# Patient Record
Sex: FEMALE | Race: BLACK OR AFRICAN AMERICAN | NOT HISPANIC OR LATINO | URBAN - METROPOLITAN AREA
[De-identification: names, ages, dates, MRNs, and addresses within clinical notes are randomized per-mention and may not be internally consistent; named-entity substitution may affect disease eponyms.]

---

## 2018-07-10 ENCOUNTER — TESTING ONLY (OUTPATIENT)
Dept: URBAN - METROPOLITAN AREA CLINIC 23 | Facility: CLINIC | Age: 78
End: 2018-07-10
Payer: COMMERCIAL

## 2018-07-10 PROCEDURE — 92083 EXTENDED VISUAL FIELD XM: CPT | Performed by: OPHTHALMOLOGY

## 2018-07-10 PROCEDURE — 92133 CPTRZD OPH DX IMG PST SGM ON: CPT | Performed by: OPHTHALMOLOGY

## 2018-07-11 ENCOUNTER — OFFICE VISIT (OUTPATIENT)
Dept: URBAN - METROPOLITAN AREA CLINIC 23 | Facility: CLINIC | Age: 78
End: 2018-07-11
Payer: COMMERCIAL

## 2018-07-11 DIAGNOSIS — H25.13 AGE-RELATED NUCLEAR CATARACT, BILATERAL: ICD-10-CM

## 2018-07-11 PROCEDURE — 99213 OFFICE O/P EST LOW 20 MIN: CPT | Performed by: OPHTHALMOLOGY

## 2018-07-11 RX ORDER — DORZOLAMIDE HCL 20 MG/ML
2 % SOLUTION/ DROPS OPHTHALMIC
Qty: 3 | Refills: 4 | Status: INACTIVE
Start: 2018-07-11 | End: 2018-07-12

## 2018-07-11 ASSESSMENT — INTRAOCULAR PRESSURE
OD: 23
OS: 22

## 2018-07-11 NOTE — IMPRESSION/PLAN
Impression: Age-related nuclear cataract, bilateral: H25.13. Plan: Discussed diagnosis in detail with patient. Discussed treatment options with patient. Discussed risks of progression. Consult recommended [Cataract Specialist].

## 2018-07-11 NOTE — IMPRESSION/PLAN
Impression: Primary open-angle glaucoma, bilateral, severe stage. Code: P64.0101. Trab OD - ALT OD x 2-ALT OS 1/12/17,  S/P -ALT OD 1/24/17-
IOP doing well os - ONH stable ou - IOP OD borderline - Intolerant to Rhopressa Plan: Discussed VF and OCT results with patient. Discussed diagnosis, explained and understood by patient. Discussed IOP/ONH/Glaucoma management and risks. Continue brimonidine bid ou, timolol bid ou, dorzolamide tid ou and lumigan qhs ou. Will continue to monitor condition and symptoms.

## 2018-08-29 ENCOUNTER — OFFICE VISIT (OUTPATIENT)
Dept: URBAN - METROPOLITAN AREA CLINIC 23 | Facility: CLINIC | Age: 78
End: 2018-08-29
Payer: COMMERCIAL

## 2018-08-29 PROCEDURE — 99214 OFFICE O/P EST MOD 30 MIN: CPT | Performed by: OPHTHALMOLOGY

## 2018-08-29 RX ORDER — OFLOXACIN 3 MG/ML
0.3 % SOLUTION/ DROPS OPHTHALMIC
Qty: 5 | Refills: 1 | Status: INACTIVE
Start: 2018-08-29 | End: 2018-11-15

## 2018-08-29 RX ORDER — PREDNISOLONE ACETATE 10 MG/ML
1 % SUSPENSION/ DROPS OPHTHALMIC
Qty: 10 | Refills: 1 | Status: INACTIVE
Start: 2018-08-29 | End: 2018-11-15

## 2018-08-29 ASSESSMENT — KERATOMETRY
OS: 45.00
OD: 44.75

## 2018-08-29 ASSESSMENT — INTRAOCULAR PRESSURE
OD: 23
OS: 21

## 2018-08-29 ASSESSMENT — VISUAL ACUITY
OS: 20/25
OD: 20/100

## 2018-08-29 NOTE — IMPRESSION/PLAN
Impression: Primary open-angle glaucoma, bilateral, severe stage. Code: Z41.2183. Trab OD - ALT OD x 2-ALT OS 1/12/17,  S/P -ALT OD 1/24/17-
IOP doing well os - ONH stable ou - IOP OD borderline - Intolerant to Rhopressa Plan: Continue brimonidine bid ou, timolol bid ou, dorzolamide tid ou and lumigan qhs ou. Will continue to monitor condition and symptoms.

## 2018-08-29 NOTE — IMPRESSION/PLAN
Impression: Combined forms of age-related cataract, bilateral: H25.813. Condition: quality of life issue. Symptoms: could improve with surgery. Vision: vision affected. Plan: Cataracts account for the patient's complaints. Discussed all risks, benefits, procedures and recovery. Patient understands changing glasses will not improve vision. Patient desires to have surgery, recommend phacoemulsification with intraocular lens. RL-2 Recommend Toric IOL aim plano.

## 2018-09-27 ENCOUNTER — PRE-OPERATIVE VISIT (OUTPATIENT)
Dept: URBAN - METROPOLITAN AREA CLINIC 23 | Facility: CLINIC | Age: 78
End: 2018-09-27
Payer: COMMERCIAL

## 2018-09-27 PROCEDURE — 92136 OPHTHALMIC BIOMETRY: CPT | Performed by: OPHTHALMOLOGY

## 2018-09-27 ASSESSMENT — PACHYMETRY
OD: 3.55
OD: 24.62
OS: 24.17
OS: 3.60

## 2018-10-01 ENCOUNTER — PRE-OPERATIVE VISIT (OUTPATIENT)
Dept: URBAN - METROPOLITAN AREA CLINIC 23 | Facility: CLINIC | Age: 78
End: 2018-10-01
Payer: COMMERCIAL

## 2018-10-01 DIAGNOSIS — H25.813 COMBINED FORMS OF AGE-RELATED CATARACT, BILATERAL: Primary | ICD-10-CM

## 2018-10-01 PROCEDURE — 92025 CPTRIZED CORNEAL TOPOGRAPHY: CPT | Performed by: OPHTHALMOLOGY

## 2018-10-05 ENCOUNTER — SURGERY (OUTPATIENT)
Dept: URBAN - METROPOLITAN AREA SURGERY 11 | Facility: SURGERY | Age: 78
End: 2018-10-05
Payer: COMMERCIAL

## 2018-10-05 PROCEDURE — 66984 XCAPSL CTRC RMVL W/O ECP: CPT | Performed by: OPHTHALMOLOGY

## 2018-10-06 ENCOUNTER — POST-OPERATIVE VISIT (OUTPATIENT)
Dept: URBAN - METROPOLITAN AREA CLINIC 23 | Facility: CLINIC | Age: 78
End: 2018-10-06

## 2018-10-06 PROCEDURE — 99024 POSTOP FOLLOW-UP VISIT: CPT | Performed by: OPTOMETRIST

## 2018-10-06 ASSESSMENT — INTRAOCULAR PRESSURE
OS: 20
OD: 29

## 2018-10-11 ENCOUNTER — POST-OPERATIVE VISIT (OUTPATIENT)
Dept: URBAN - METROPOLITAN AREA CLINIC 23 | Facility: CLINIC | Age: 78
End: 2018-10-11

## 2018-10-11 DIAGNOSIS — Z09 ENCNTR FOR F/U EXAM AFT TRTMT FOR COND OTH THAN MALIG NEOPLM: Primary | ICD-10-CM

## 2018-10-11 PROCEDURE — 99024 POSTOP FOLLOW-UP VISIT: CPT | Performed by: OPTOMETRIST

## 2018-10-11 ASSESSMENT — INTRAOCULAR PRESSURE
OS: 24
OD: 34

## 2018-10-11 ASSESSMENT — VISUAL ACUITY
OD: 20/400
OS: 20/30

## 2018-11-15 ENCOUNTER — OFFICE VISIT (OUTPATIENT)
Dept: URBAN - METROPOLITAN AREA CLINIC 23 | Facility: CLINIC | Age: 78
End: 2018-11-15
Payer: COMMERCIAL

## 2018-11-15 PROCEDURE — 99213 OFFICE O/P EST LOW 20 MIN: CPT | Performed by: OPHTHALMOLOGY

## 2018-11-15 RX ORDER — BIMATOPROST 0.1 MG/ML
0.01 % SOLUTION/ DROPS OPHTHALMIC
Qty: 3 | Refills: 3 | Status: INACTIVE
Start: 2018-11-15 | End: 2019-08-20

## 2018-11-15 ASSESSMENT — INTRAOCULAR PRESSURE
OD: 36
OS: 24

## 2018-11-15 NOTE — IMPRESSION/PLAN
Impression: Primary open-angle glaucoma, bilateral, severe stage. Code: Z37.7508. Trab OD - ALT OD x 2-ALT OS 1/12/17,  S/P -ALT OD 1/24/17-
IOP elevated OU-increased cupping OU Intolerant to Rhopressa--Pt has been off glaucoma gtts and restarted 1 week ago
thick cct's may need to adjust IOP Plan: Discussed diagnosis, explained and understood by patient. Discussed IOP/ONH/Glaucoma management and risks. Continue Lumigan QAM OU, Timolol BID OU, Dorzolamide BID OU, Brimonidine BID OU. Stressed compliance. Discussed possible need for additional surgery if IOP is not adequately controlled with gtts. Will continue to monitor condition and symptoms.

## 2018-11-15 NOTE — IMPRESSION/PLAN
Impression: Age-related nuclear cataract, left eye: H25.12. Plan: Discussed diagnosis in detail with patient. Discussed treatment options with patient. Discussed risks of progression. Surgical treatment is required. Surgical risks and benefits were discussed, explained and understood by patient. Patient elects to have surgery.

## 2018-11-29 ENCOUNTER — OFFICE VISIT (OUTPATIENT)
Dept: URBAN - METROPOLITAN AREA CLINIC 32 | Facility: CLINIC | Age: 78
End: 2018-11-29
Payer: COMMERCIAL

## 2018-11-29 PROCEDURE — 99213 OFFICE O/P EST LOW 20 MIN: CPT | Performed by: OPHTHALMOLOGY

## 2018-11-29 RX ORDER — PREDNISOLONE ACETATE 10 MG/ML
1 % SUSPENSION/ DROPS OPHTHALMIC
Qty: 1 | Refills: 0 | Status: INACTIVE
Start: 2018-11-29 | End: 2019-05-03

## 2018-11-29 ASSESSMENT — INTRAOCULAR PRESSURE
OS: 14
OD: 26

## 2018-11-29 NOTE — IMPRESSION/PLAN
Impression: Primary open-angle glaucoma, bilateral, severe stage. Code: W48.6927. thick CCT's ou - Trab OD - ALT OD x 2-ALT OS 1/12/17,  S/P -ALT OD 1/24/17-
IOP OS doing well-**IOP OD still too high. Intolerant to Rhopressa--
thick cct's may need to adjust IOP Plan: Discussed diagnosis, explained and understood by patient. Discussed IOP/ONH/Glaucoma management and risks. Discussed aqueous shunt vs TSSCPC laser. Continue Lumigan QAM OU, Timolol BID OU, Dorzolamide BID OU, Brimonidine BID OU. 
-recommends Gprobe Trans-scleral CPC laser OD to lower IOP. Discussed RBA's and laser procedure. RL=2
-patient elects TSSCPC laser OD.

## 2019-05-03 ENCOUNTER — OFFICE VISIT (OUTPATIENT)
Dept: URBAN - METROPOLITAN AREA CLINIC 23 | Facility: CLINIC | Age: 79
End: 2019-05-03
Payer: COMMERCIAL

## 2019-05-03 PROCEDURE — 99213 OFFICE O/P EST LOW 20 MIN: CPT | Performed by: OPTOMETRIST

## 2019-05-03 RX ORDER — NETARSUDIL 0.2 MG/ML
0.02 % SOLUTION/ DROPS OPHTHALMIC; TOPICAL
Qty: 1 | Refills: 0 | Status: INACTIVE
Start: 2019-05-03 | End: 2019-06-14

## 2019-05-03 ASSESSMENT — INTRAOCULAR PRESSURE
OS: 24
OD: 30

## 2019-05-03 NOTE — IMPRESSION/PLAN
Impression: Primary open-angle glaucoma, bilateral, severe stage: H40.1133 OU. Plan: Discussed findings. Iop high. Added Rhopressa qhs OD. Sample given. Recheck iop in 3 weeks. Briefly discussed sx, pt declined, wants to try drops and do sx as last resort.

## 2019-06-14 ENCOUNTER — OFFICE VISIT (OUTPATIENT)
Dept: URBAN - METROPOLITAN AREA CLINIC 23 | Facility: CLINIC | Age: 79
End: 2019-06-14
Payer: COMMERCIAL

## 2019-06-14 PROCEDURE — 99213 OFFICE O/P EST LOW 20 MIN: CPT | Performed by: OPTOMETRIST

## 2019-06-14 RX ORDER — NETARSUDIL 0.2 MG/ML
0.02 % SOLUTION/ DROPS OPHTHALMIC; TOPICAL
Qty: 3 | Refills: 3 | Status: INACTIVE
Start: 2019-06-14 | End: 2019-08-20

## 2019-06-14 RX ORDER — DORZOLAMIDE HCL 20 MG/ML
2 % SOLUTION/ DROPS OPHTHALMIC
Qty: 3 | Refills: 3 | Status: INACTIVE
Start: 2019-06-14 | End: 2019-08-20

## 2019-06-14 RX ORDER — TIMOLOL MALEATE 5 MG/ML
0.5 % SOLUTION/ DROPS OPHTHALMIC
Qty: 1 | Refills: 10 | Status: INACTIVE
Start: 2019-06-14 | End: 2019-08-20

## 2019-06-14 ASSESSMENT — INTRAOCULAR PRESSURE
OD: 22
OS: 15
OS: 21
OD: 28

## 2019-06-14 NOTE — IMPRESSION/PLAN
Impression: Primary open-angle glaucoma, bilateral, severe stage: H40.1133 OU. Plan: Discussed findings. OD iop not as reduced as it should be. Recommended consult for sx, but pt declined at this time. May think about it in the future. Pt to continue all drops and return for iop check.

## 2019-07-17 ENCOUNTER — OFFICE VISIT (OUTPATIENT)
Dept: URBAN - METROPOLITAN AREA CLINIC 23 | Facility: CLINIC | Age: 79
End: 2019-07-17
Payer: COMMERCIAL

## 2019-07-17 PROCEDURE — 99213 OFFICE O/P EST LOW 20 MIN: CPT | Performed by: OPTOMETRIST

## 2019-07-17 RX ORDER — BRIMONIDINE TARTRATE, TIMOLOL MALEATE 2; 5 MG/ML; MG/ML
SOLUTION/ DROPS OPHTHALMIC
Qty: 3 | Refills: 3 | Status: INACTIVE
Start: 2019-07-17 | End: 2020-06-19

## 2019-07-17 ASSESSMENT — INTRAOCULAR PRESSURE
OS: 15
OD: 23

## 2019-07-17 NOTE — IMPRESSION/PLAN
Impression: Primary open-angle glaucoma, bilateral, severe stage: H40.1133 OU. Plan: Discussed findings. Iop OD not lowering. Per pt is compliant with all drops and waits between them. Will switch to Combigan to make it easier for pt and reduce preservative. Pt would like to continue trying drops vs sx. Monitor.

## 2019-08-20 ENCOUNTER — OFFICE VISIT (OUTPATIENT)
Dept: URBAN - METROPOLITAN AREA CLINIC 23 | Facility: CLINIC | Age: 79
End: 2019-08-20
Payer: COMMERCIAL

## 2019-08-20 PROCEDURE — 99213 OFFICE O/P EST LOW 20 MIN: CPT | Performed by: OPTOMETRIST

## 2019-08-20 ASSESSMENT — KERATOMETRY
OS: 44.38
OD: 44.75

## 2019-08-20 ASSESSMENT — INTRAOCULAR PRESSURE
OD: 22
OS: 15

## 2019-08-20 NOTE — IMPRESSION/PLAN
Impression: Primary open-angle glaucoma, bilateral, severe stage: H40.1133 OU. Plan: Discussed findings. Iop not lower with drops. Briefly discussed TSCP to reduce iop. Recommend pt see glaucoma specialist. Pt to continue using Combigan as prescribed. Stent has been recommended but she would like to avoid incisional surgery. She has had previous ALT OU.  Discussed that without lowering IOP further, vision will continue to be lost.

## 2019-11-15 ENCOUNTER — NEW PATIENT (OUTPATIENT)
Dept: URBAN - METROPOLITAN AREA CLINIC 24 | Facility: CLINIC | Age: 79
End: 2019-11-15
Payer: COMMERCIAL

## 2019-11-15 PROCEDURE — 76514 ECHO EXAM OF EYE THICKNESS: CPT | Performed by: OPHTHALMOLOGY

## 2019-11-15 PROCEDURE — 92133 CPTRZD OPH DX IMG PST SGM ON: CPT | Performed by: OPHTHALMOLOGY

## 2019-11-15 PROCEDURE — 92083 EXTENDED VISUAL FIELD XM: CPT | Performed by: OPHTHALMOLOGY

## 2019-11-15 PROCEDURE — 92250 FUNDUS PHOTOGRAPHY W/I&R: CPT | Performed by: OPHTHALMOLOGY

## 2019-11-15 PROCEDURE — 92020 GONIOSCOPY: CPT | Performed by: OPHTHALMOLOGY

## 2019-11-15 PROCEDURE — 99204 OFFICE O/P NEW MOD 45 MIN: CPT | Performed by: OPHTHALMOLOGY

## 2019-11-15 RX ORDER — NETARSUDIL AND LATANOPROST OPHTHALMIC SOLUTION, 0.02%/0.005% .2; .05 MG/ML; MG/ML
SOLUTION/ DROPS OPHTHALMIC; TOPICAL
Qty: 1 | Refills: 3 | Status: INACTIVE
Start: 2019-11-15 | End: 2020-06-19

## 2019-11-15 ASSESSMENT — INTRAOCULAR PRESSURE
OD: 38
OS: 28

## 2019-12-05 ENCOUNTER — FOLLOW UP ESTABLISHED (OUTPATIENT)
Dept: URBAN - METROPOLITAN AREA CLINIC 24 | Facility: CLINIC | Age: 79
End: 2019-12-05
Payer: COMMERCIAL

## 2019-12-05 PROCEDURE — 92014 COMPRE OPH EXAM EST PT 1/>: CPT | Performed by: OPHTHALMOLOGY

## 2019-12-05 ASSESSMENT — INTRAOCULAR PRESSURE
OS: 18
OD: 19

## 2020-02-27 ENCOUNTER — FOLLOW UP ESTABLISHED (OUTPATIENT)
Dept: URBAN - METROPOLITAN AREA CLINIC 24 | Facility: CLINIC | Age: 80
End: 2020-02-27
Payer: COMMERCIAL

## 2020-02-27 PROCEDURE — 92014 COMPRE OPH EXAM EST PT 1/>: CPT | Performed by: OPHTHALMOLOGY

## 2020-02-27 ASSESSMENT — INTRAOCULAR PRESSURE
OS: 20
OD: 23

## 2020-04-10 ENCOUNTER — Encounter (OUTPATIENT)
Dept: URBAN - METROPOLITAN AREA CLINIC 24 | Facility: CLINIC | Age: 80
End: 2020-04-10
Payer: COMMERCIAL

## 2020-04-10 DIAGNOSIS — H40.1133 PRIMARY OPEN-ANGLE GLAUCOMA, BILATERAL, SEVERE STAGE: Primary | ICD-10-CM

## 2020-04-10 PROCEDURE — 65855 TRABECULOPLASTY LASER SURG: CPT | Performed by: OPHTHALMOLOGY

## 2020-06-19 ENCOUNTER — FOLLOW UP ESTABLISHED (OUTPATIENT)
Dept: URBAN - METROPOLITAN AREA CLINIC 24 | Facility: CLINIC | Age: 80
End: 2020-06-19
Payer: COMMERCIAL

## 2020-06-19 PROCEDURE — 92012 INTRM OPH EXAM EST PATIENT: CPT | Performed by: OPHTHALMOLOGY

## 2020-06-19 RX ORDER — NETARSUDIL AND LATANOPROST OPHTHALMIC SOLUTION, 0.02%/0.005% .2; .05 MG/ML; MG/ML
SOLUTION/ DROPS OPHTHALMIC; TOPICAL
Qty: 1 | Refills: 3 | Status: INACTIVE
Start: 2020-06-19 | End: 2021-05-05

## 2020-06-19 RX ORDER — DORZOLAMIDE HCL 20 MG/ML
2 % SOLUTION/ DROPS OPHTHALMIC
Qty: 1 | Refills: 3 | Status: INACTIVE
Start: 2020-06-19 | End: 2021-03-12

## 2020-06-19 RX ORDER — DORZOLAMIDE HCL 20 MG/ML
2 % SOLUTION/ DROPS OPHTHALMIC
Qty: 1 | Refills: 3 | Status: INACTIVE
Start: 2020-06-19 | End: 2020-06-19

## 2020-06-19 RX ORDER — BRIMONIDINE TARTRATE, TIMOLOL MALEATE 2; 5 MG/ML; MG/ML
SOLUTION/ DROPS OPHTHALMIC
Qty: 3 | Refills: 3 | Status: INACTIVE
Start: 2020-06-19 | End: 2020-08-03

## 2020-06-19 ASSESSMENT — INTRAOCULAR PRESSURE
OD: 33
OS: 33

## 2020-08-20 ENCOUNTER — FOLLOW UP ESTABLISHED (OUTPATIENT)
Dept: URBAN - METROPOLITAN AREA CLINIC 24 | Facility: CLINIC | Age: 80
End: 2020-08-20
Payer: COMMERCIAL

## 2020-08-20 PROCEDURE — 92014 COMPRE OPH EXAM EST PT 1/>: CPT | Performed by: OPHTHALMOLOGY

## 2020-08-20 ASSESSMENT — INTRAOCULAR PRESSURE
OD: 19
OS: 18

## 2021-02-25 ENCOUNTER — OFFICE VISIT (OUTPATIENT)
Dept: URBAN - METROPOLITAN AREA CLINIC 29 | Facility: CLINIC | Age: 81
End: 2021-02-25
Payer: COMMERCIAL

## 2021-02-25 PROCEDURE — 92133 CPTRZD OPH DX IMG PST SGM ON: CPT | Performed by: OPHTHALMOLOGY

## 2021-02-25 PROCEDURE — 99214 OFFICE O/P EST MOD 30 MIN: CPT | Performed by: OPHTHALMOLOGY

## 2021-02-25 RX ORDER — PREDNISOLONE ACETATE 10 MG/ML
1 % SUSPENSION/ DROPS OPHTHALMIC
Qty: 5 | Refills: 0 | Status: INACTIVE
Start: 2021-02-25 | End: 2021-03-25

## 2021-02-25 RX ORDER — DORZOLAMIDE HCL 20 MG/ML
2 % SOLUTION/ DROPS OPHTHALMIC
Qty: 5 | Refills: 11 | Status: INACTIVE
Start: 2021-02-25 | End: 2021-03-25

## 2021-02-25 ASSESSMENT — INTRAOCULAR PRESSURE
OS: 23
OD: 28

## 2021-02-25 NOTE — IMPRESSION/PLAN
Impression: Primary open-angle glaucoma, right eye, severe stage: H40.1113. CCT thick OU Trab OD ALT OS 1/12/17 ALT OD 1/24/17 Intolerant to Rhopressa, combigan
IOP elevated OD, stable OS Plan: Discussed diagnosis, explained and understood by patient. Discussed IOP/ONH/Glaucoma management and risks. OCT ordered, performed and reviewed. Continue dorzolamide tid ou and rocklatan qhs ou. D/C combigan, unable to tolerate. Discussed side effects of glaucoma meds. Recommend Micro-pulse Trans-scleral CPC diode laser OD to possibly lower IOP. Patient elects [facility] laser procedure. Discussed RBA's and treatment options. RL=2. Start prednisolone qid after laser.

## 2021-02-25 NOTE — IMPRESSION/PLAN
Impression: Other secondary cataract, right eye: H26.491. Plan: Discussed diagnosis in detail with patient. Discussed treatment options and advised patient of condition. Recommend YAG  to possibly improve vision. Patient elects to have YAG. Discussed RBA's and laser procedure.  RL=2.

## 2021-03-25 ENCOUNTER — OFFICE VISIT (OUTPATIENT)
Dept: URBAN - METROPOLITAN AREA CLINIC 29 | Facility: CLINIC | Age: 81
End: 2021-03-25
Payer: MEDICARE

## 2021-03-25 DIAGNOSIS — H40.1122 PRIMARY OPEN-ANGLE GLAUCOMA, LEFT EYE, MODERATE STAGE: ICD-10-CM

## 2021-03-25 DIAGNOSIS — H40.1113 PRIMARY OPEN-ANGLE GLAUCOMA, RIGHT EYE, SEVERE STAGE: ICD-10-CM

## 2021-03-25 DIAGNOSIS — H35.372 PUCKERING OF MACULA, LEFT EYE: Primary | ICD-10-CM

## 2021-03-25 DIAGNOSIS — H52.223 REGULAR ASTIGMATISM, BILATERAL: ICD-10-CM

## 2021-03-25 PROCEDURE — 92134 CPTRZ OPH DX IMG PST SGM RTA: CPT | Performed by: OPTOMETRIST

## 2021-03-25 PROCEDURE — 92014 COMPRE OPH EXAM EST PT 1/>: CPT | Performed by: OPTOMETRIST

## 2021-03-25 ASSESSMENT — VISUAL ACUITY
OD: 20/200
OS: 20/30

## 2021-03-25 ASSESSMENT — INTRAOCULAR PRESSURE
OS: 22
OD: 28

## 2021-03-25 ASSESSMENT — KERATOMETRY
OS: 44.75
OD: 44.88

## 2021-03-25 NOTE — IMPRESSION/PLAN
Impression: Puckering of macula, left eye: H35.372. Plan: Discussed diagnosis in detail with patient. No treatment is required at this time. Will continue to observe condition and or symptoms. Reassured patient of current condition and treatment. Call if Symptoms occur.

## 2021-03-25 NOTE — IMPRESSION/PLAN
Impression: Primary open-angle glaucoma, left eye, moderate stage: Y33.5917. Plan: Discussed diagnosis in detail with patient. Discussed treatment options with patient. No change to current treatment. Will continue to observe condition and or symptoms. Continue using current medication(s). Emphasized and explained compliance.

## 2021-03-25 NOTE — IMPRESSION/PLAN
Impression: Primary open-angle glaucoma, right eye, severe stage: H40.1113. Plan: Discussed diagnosis in detail with patient. Discussed treatment options with patient. No change to current treatment. Will continue to observe condition and or symptoms. Continue using current medication(s). Emphasized and explained compliance.

## 2021-03-25 NOTE — ASSESSMENT/PLAN
Impression: OCT MAC - OD: Good-; OS: Good- Plan: Central macular traction without detachment or edema OS

## 2021-03-25 NOTE — IMPRESSION/PLAN
Impression: Age-related nuclear cataract, left eye: H25.12. Plan: Discussed diagnosis in detail with patient. No treatment is required at this time. Patient will update glasses RX first. Will continue to observe condition and or symptoms.

## 2021-05-05 ENCOUNTER — OFFICE VISIT (OUTPATIENT)
Dept: URBAN - METROPOLITAN AREA CLINIC 24 | Facility: CLINIC | Age: 81
End: 2021-05-05
Payer: MEDICARE

## 2021-05-05 PROCEDURE — 99214 OFFICE O/P EST MOD 30 MIN: CPT | Performed by: OPHTHALMOLOGY

## 2021-05-05 RX ORDER — DORZOLAMIDE HCL 20 MG/ML
2 % SOLUTION/ DROPS OPHTHALMIC
Qty: 15 | Refills: 3 | Status: ACTIVE
Start: 2021-05-05

## 2021-05-05 RX ORDER — NETARSUDIL AND LATANOPROST OPHTHALMIC SOLUTION, 0.02%/0.005% .2; .05 MG/ML; MG/ML
SOLUTION/ DROPS OPHTHALMIC; TOPICAL
Qty: 5 | Refills: 3 | Status: INACTIVE
Start: 2021-05-05 | End: 2021-06-30

## 2021-05-05 RX ORDER — BRIMONIDINE TARTRATE, TIMOLOL MALEATE 2; 5 MG/ML; MG/ML
SOLUTION/ DROPS OPHTHALMIC
Qty: 15 | Refills: 3 | Status: INACTIVE
Start: 2021-05-05 | End: 2021-12-06

## 2021-05-05 ASSESSMENT — INTRAOCULAR PRESSURE
OD: 38
OS: 25

## 2021-05-05 NOTE — IMPRESSION/PLAN
Impression: Other secondary cataract, right eye: H26.491. Plan: discussed diagnosis with patient. patient elects treatment.  Plan for YAG OD

## 2021-05-05 NOTE — IMPRESSION/PLAN
Impression: Primary open-angle glaucoma, severe stage, bilateral
s/p Trab OD Linell Chess s/p ALT Erasmo OU Plan: Pt has Glaucoma    Gonio :  open to SS with changes from ALT OU    Pachs: 586/601    Today's IOP : 38/25 Target IOP low teens Pt denies Fhx of Glaucoma Left eye is the better seeing eye  
HVF(11/15/19) - poor testing, may repeat field OU. C/D:0.9-0.95/0.85-0.85 Pt denies Sulfa Allergy   // Pt denies Lung /Heart dx Plan :
1. Continue (patient reports she has been out of come drops) all samples given Combigan BID OU, 
Dorzolamide TID OU, Rocklatan QHS OU. Discussed details about Glaucoma and that without proper control of pressures irreversible blindness can occur. Patient understands risks. Emphasize compliance with drop and without compliance vision loss progression can occur. 2. IOP is high  OU, given patients amount of vision loss concluded that it may too high. 3. Discussed with patient due to surgical history, the other options are more aggressive. XEN vs Diode vs Tube Shunt was discussed. Risks/benefits were discussed and understood by patient. Patient defers surgery 8/2020-5/2021 4.  Patient to return in 2 weeks for IOP on all meds with Optom,

## 2021-05-21 ENCOUNTER — OFFICE VISIT (OUTPATIENT)
Dept: URBAN - METROPOLITAN AREA CLINIC 24 | Facility: CLINIC | Age: 81
End: 2021-05-21
Payer: MEDICARE

## 2021-05-21 PROCEDURE — 99203 OFFICE O/P NEW LOW 30 MIN: CPT | Performed by: OPTOMETRIST

## 2021-05-21 ASSESSMENT — INTRAOCULAR PRESSURE
OS: 19
OD: 23

## 2021-05-21 NOTE — IMPRESSION/PLAN
Impression: Primary open-angle glaucoma, severe stage, bilateral
s/p Trab OD Kiran Steven s/p ALT Erasmo OU Plan: IOP improved from previous, pt reports strict compliance with gtts. Continue Combigan BID OU, 
Dorzolamide TID OU, Rocklatan QHS OU. Patient no longer wants care with Dr. Kiran Steven, is requesting all glc care with Dr. Corby Dupree.

## 2021-06-30 ENCOUNTER — OFFICE VISIT (OUTPATIENT)
Dept: URBAN - METROPOLITAN AREA CLINIC 24 | Facility: CLINIC | Age: 81
End: 2021-06-30
Payer: MEDICARE

## 2021-06-30 DIAGNOSIS — H25.12 AGE-RELATED NUCLEAR CATARACT, LEFT EYE: ICD-10-CM

## 2021-06-30 PROCEDURE — 99214 OFFICE O/P EST MOD 30 MIN: CPT | Performed by: OPHTHALMOLOGY

## 2021-06-30 RX ORDER — NETARSUDIL AND LATANOPROST OPHTHALMIC SOLUTION, 0.02%/0.005% .2; .05 MG/ML; MG/ML
SOLUTION/ DROPS OPHTHALMIC; TOPICAL
Qty: 5 | Refills: 3 | Status: INACTIVE
Start: 2021-06-30 | End: 2021-09-09

## 2021-06-30 ASSESSMENT — INTRAOCULAR PRESSURE
OD: 22
OS: 17

## 2021-06-30 NOTE — IMPRESSION/PLAN
Impression: Age-related nuclear cataract, left eye: H25.12. Plan: At patient desire will proceed Discussed cataract diagnosis with the patient. Discussed risks, benefits and alternatives to surgery including but not limited to: bleeding, infection, risk of vision loss, loss of the eye, need for other surgery. Patient voiced understanding and wishes to proceed. Patient elects surgical treatment. Specialty lens options discussed and pt declines. Patient desires surgery OU (( AIM  PL OU,)) Patient understands the need for glasses after surgery for BCVA. MIGS Discussed with pt limitations of MIGS device and it does not replace  Glaucoma eye drops and would have to continue treatment and would still need glasses after surgery. Understands possible use of iris stretch. Risk Level: 
Left eye second(PC IOL (Standard W/ IStent/canaloplasty /Hydrus ) Minutes Drops

## 2021-06-30 NOTE — IMPRESSION/PLAN
Impression: Primary open-angle glaucoma, severe stage, bilateral
s/p Trab OD Aislinn Jones s/p ALT Erasmo OU Plan: Pt has Glaucoma    Gonio :  open to SS with changes from ALT OU    Pachs: 586/601    Today's IOP : 22/17 Target IOP low teens Pt denies Fhx of Glaucoma Left eye is the better seeing eye  
HVF(11/15/19) - poor testing, may repeat field OU. C/D:0.9-0.95/0.85-0.85 Pt denies Sulfa Allergy   // Pt denies Lung /Heart dx Plan :
1. Continue (patient reports she has been out of come drops) all samples given Combigan BID OU, 
Dorzolamide TID OU, Rocklatan QHS OU. Discussed details about Glaucoma and that without proper control of pressures irreversible blindness can occur. Patient understands risks. Emphasize compliance with drop and without compliance vision loss progression can occur. 2.IOP is still elevated. 3.Micropluse OD  Discussed with the patient the findings of today's exam. IOP is too high for the level of glaucomatous damage at the present time. I recommend patient have a Satish Lint. Risks, benefits, alternatives to laser discussed with patient, including pain, bleeding, loss of vision, loss of eye, need for further surgery, double vision, retinal problems, infection, and inflammation. Although surgery is expected to improve the condition, the patient understands that there is a chance the condition could worsen in the future. The procedure is being done in an attempt to preserve the current level of vision. Vision improvement is NOT expected. Explained to patient the procedure has a 50% success rate and additional procedures may be necessary. Patient advised that ongoing uncontrolled glaucoma may result in permanent vision loss. All questions answered. Patient wishes to proceed with laser.
-See Alexia Perez to schedule Micropulse MP3 laser in the OD/OS Eye, H&P, Pred QID 4.  Return to clinic in 1-2 mo IOP check VFT and RNFL// Gl[facility] OS only

## 2021-09-09 ENCOUNTER — OFFICE VISIT (OUTPATIENT)
Dept: URBAN - METROPOLITAN AREA CLINIC 29 | Facility: CLINIC | Age: 81
End: 2021-09-09
Payer: MEDICARE

## 2021-09-09 PROCEDURE — 99214 OFFICE O/P EST MOD 30 MIN: CPT | Performed by: OPHTHALMOLOGY

## 2021-09-09 PROCEDURE — 92020 GONIOSCOPY: CPT | Performed by: OPHTHALMOLOGY

## 2021-09-09 RX ORDER — NETARSUDIL AND LATANOPROST OPHTHALMIC SOLUTION, 0.02%/0.005% .2; .05 MG/ML; MG/ML
SOLUTION/ DROPS OPHTHALMIC; TOPICAL
Qty: 5 | Refills: 3 | Status: ACTIVE
Start: 2021-09-09

## 2021-09-09 ASSESSMENT — INTRAOCULAR PRESSURE
OD: 27
OS: 27

## 2021-09-09 NOTE — IMPRESSION/PLAN
Impression: Primary open-angle glaucoma, left eye, moderate stage: E72.0035. Plan: Discussed adding drops vs laser. Patient elects SLT. Recommend Trabeculoplasty (SLT) OS to possibly lower IOP. Discussed RBA's of laser trabeculoplasty .  RL=2

## 2021-09-09 NOTE — IMPRESSION/PLAN
Impression: Primary open-angle glaucoma, right eye, severe stage: H40.1113. CCT thick OU Trab OD ALT OS 1/12/17 ALT OD 1/24/17 Intolerant to Rhopressa, combigan
IOP elevated OD >OS Plan: Discussed diagnosis, explained and understood by patient. Discussed IOP/ONH/Glaucoma management and risks. Continue dorzolamide tid ou and rocklatan qhs ou. combigan bid ou. .  Discussed adding drops vs laser. Patient elects SLT. Recommend Trabeculoplasty (SLT) OD to possibly lower IOP. Discussed RBA's of laser trabeculoplasty .  RL=2

## 2022-02-09 ENCOUNTER — OFFICE VISIT (OUTPATIENT)
Dept: URBAN - METROPOLITAN AREA CLINIC 24 | Facility: CLINIC | Age: 82
End: 2022-02-09
Payer: MEDICARE

## 2022-02-09 DIAGNOSIS — H26.491 OTHER SECONDARY CATARACT, RIGHT EYE: ICD-10-CM

## 2022-02-09 PROCEDURE — 99214 OFFICE O/P EST MOD 30 MIN: CPT | Performed by: OPHTHALMOLOGY

## 2022-02-09 RX ORDER — PREDNISOLONE ACETATE 10 MG/ML
1 % SUSPENSION/ DROPS OPHTHALMIC
Qty: 5 | Refills: 1 | Status: INACTIVE
Start: 2022-02-09 | End: 2022-04-08

## 2022-02-09 ASSESSMENT — INTRAOCULAR PRESSURE
OS: 31
OD: 36

## 2022-02-09 NOTE — IMPRESSION/PLAN
Impression: Primary open-angle glaucoma, severe stage, bilateral
s/p Trab OD George Meyer s/p ALT Erasmo OU Plan: Pt has Glaucoma    Gonio :  open to SS with changes from ALT OU    Pachs: 586/601    Today's IOP : 36, 31 Target IOP low teens Pt denies Fhx of Glaucoma Left eye is the better seeing eye  
HVF(11/15/19) - poor testing, may repeat field OU. C/D:0.9-0.95/0.85-0.85 Pt denies Sulfa Allergy   // Pt denies Lung /Heart dx Plan :
1. Medications:
Combigan BID OU, 
Dorzolamide TID OU, Rocklatan QHS OU. 
2. IOP continues to be elevated ou. Per patient she is hesitant about surgery. Advised that because of advancement of visual field, it was recommend to lower iop. 3. SLT was discussed with patient but advised that it may not be enough to lower iop and may require additional surgical treatment. The patient is aware of the limitations of SLT , which can lower IOP 2-4mm for 6--12 months. The Patient is aware that SLT cannot improve the vision nor eliminate the need for the topical medications. If on any glaucoma medications, the patient is aware that SLT is not a replacement for the current medical regimen. 4. Schedule SLT OD then OS.

## 2022-02-11 ENCOUNTER — SURGERY (OUTPATIENT)
Dept: URBAN - METROPOLITAN AREA SURGERY 12 | Facility: SURGERY | Age: 82
End: 2022-02-11
Payer: MEDICARE

## 2022-02-11 PROCEDURE — 65855 TRABECULOPLASTY LASER SURG: CPT | Performed by: OPHTHALMOLOGY

## 2022-05-05 NOTE — IMPRESSION/PLAN
Impression: Primary open-angle glaucoma, severe stage, bilateral
s/p Trab OD Marsa Eis s/p ALT Erasmo OU
s/p SLT OU 2022 with Dr. Zahraa Mabry: Pt has Glaucoma    Gonio :  open to SS with changes from ALT OU    Pachs: 586/601    Today's IOP : 20/14 Target IOP low teens Pt denies Fhx of Glaucoma Left eye is the better seeing eye  
HVF(11/15/19) - poor testing, may repeat field OU. C/D:0.9-0.95/0.85-0.85 Pt denies Sulfa Allergy // Pt denies Lung /Heart dx Plan :
1. Medications:
Combigan BID OU Dorzolamide TID OU Rocklatan QHS OU Timolol ou qam
2. s/p SLT OU, IOP is doing much better today after treatment. 3. Return as scheduled for YAG laser OD. 4. Return in 2-3 months for iop check and

## 2022-05-20 NOTE — IMPRESSION/PLAN
Impression: Primary open-angle glaucoma, severe stage, bilateral
s/p Trab OD Almer Polio s/p ALT Erasmo OU
s/p SLT OU 2022 with Dr. Sunshine Geraldo: Pt has Glaucoma    Gonio :  open to SS with changes from ALT OU    Pachs: 586/601    Today's IOP : 22/15 Target IOP low teens Pt denies Fhx of Glaucoma Left eye is the better seeing eye  
HVF(11/15/19) - poor testing, may repeat field OU. C/D:0.9-0.95/0.85-0.85 Pt denies Sulfa Allergy // Pt denies Lung /Heart dx Plan :
1. Medications:
Combigan BID OU Dorzolamide TID OU Rocklatan QHS OU Timolol ou qam
2. s/p SLT OU, IOP is doing much better today after treatment. 3. Return as scheduled for YAG laser OD. 4. Return as scheduled for iop check and

## 2022-05-20 NOTE — IMPRESSION/PLAN
Impression: Other secondary cataract, right eye: H26.491. Plan: Discussed diagnosis of Opacified capsule with patient. Recommend Laser YAG Capsulotomy to improve vision. Risks, benefits, alternatives to surgery discussed with patient including pain, increase in intraocular pressure, bleeding, inflammation, loss of vision, increased floaters, and need for more surgery. Patient understands the risks and wishes to proceed with  YAG laser capsulotomy. All questions answered. 

See PCCs to schedule Yag OD

## 2022-07-15 NOTE — IMPRESSION/PLAN
Impression: Other secondary cataract, right eye: H26.491.  Plan: s/p Yag Cap OD - pt feels vision has improved

## 2022-07-15 NOTE — IMPRESSION/PLAN
Impression: Primary open-angle glaucoma, severe stage, bilateral
s/p Trab OD Roverto Ballardr s/p ALT Erasmo OU
s/p SLT OU 2022 with Dr. Daisy Moura: Pt has Glaucoma    Gonio :  open to SS with changes from ALT OU    Pachs: 586/601    Today's IOP : 23/13 Target IOP low teens Pt denies Fhx of Glaucoma Left eye is the better seeing eye;  
HVF(11/15/19) - poor testing, may repeat field OU. C/D:0.9-0.95/0.85-0.85 Pt denies Sulfa Allergy // Pt denies Lung /Heart dx Plan :
1. Medications:
Brimonidine BID OU Dorzolamide TID OU - ran out Rocklatan QHS OU Timolol ou qam
2. Discussed goals OD - pt defers diode laser OD today 3. Refill of meds today and return for IOP check in 1-2 months with optometry 4.  If IOP high OD, pt may elect to have diode scheduled or continue to monitor only - return to Dr Shirlene Pallas for changes OS or if she elects intervention OD

## 2022-11-09 ENCOUNTER — OFFICE VISIT (OUTPATIENT)
Dept: URBAN - METROPOLITAN AREA CLINIC 24 | Facility: CLINIC | Age: 82
End: 2022-11-09
Payer: MEDICARE

## 2022-11-09 DIAGNOSIS — H40.1133 PRIMARY OPEN-ANGLE GLAUCOMA, BILATERAL, SEVERE STAGE: Primary | ICD-10-CM

## 2022-11-09 DIAGNOSIS — H25.12 AGE-RELATED NUCLEAR CATARACT, LEFT EYE: ICD-10-CM

## 2022-11-09 PROCEDURE — 99213 OFFICE O/P EST LOW 20 MIN: CPT | Performed by: STUDENT IN AN ORGANIZED HEALTH CARE EDUCATION/TRAINING PROGRAM

## 2022-11-09 ASSESSMENT — INTRAOCULAR PRESSURE
OD: 30
OS: 21

## 2022-11-09 NOTE — IMPRESSION/PLAN
Impression: Age-related nuclear cataract, left eye: H25.12.  Plan: Contributes to reduced vision, re-eval at f/u

## 2022-11-09 NOTE — IMPRESSION/PLAN
Impression: Primary open-angle glaucoma, severe stage, bilateral
s/p Trab OD Homer Jeseitar s/p ALT Erasmo OU
s/p SLT OU 2022 with Dr. Layla Ross Pachs: 586/601 Pt denies Sulfa Allergy // Pt denies Lung /Heart dx Per Dr Layla Ross 7/2022 cont with optom unless laser OD or worsening OS Plan: IOP elevated OU @30/21 reports good compliance with all gtts but had difficulty refilling rocklatan and was out for few days C/D:0.9-0.95/0.85-0.85 OCT RNFL x 2/2022, floor effect (59/50) HVF 24-2 x 2/2022 OD fair rel / severe constriction with in temp [address] OS low rel / SN & IN step with enlarged BS Left eye is the better seeing eye 1. Cont:
Brimonidine BID OU Dorzolamide TID OU - ran out Rocklatan QHS OU Timolol ou qam
2. Reviewed discussion initated by Dr Layla Ross with elevated pressures OD, again defers diode. 
3. RTC 6-8 wk for IOP / dilated exam

## 2023-03-23 ENCOUNTER — OFFICE VISIT (OUTPATIENT)
Dept: URBAN - METROPOLITAN AREA CLINIC 28 | Facility: CLINIC | Age: 83
End: 2023-03-23
Payer: MEDICARE

## 2023-03-23 DIAGNOSIS — H40.1122 PRIMARY OPEN-ANGLE GLAUCOMA, LEFT EYE, MODERATE STAGE: ICD-10-CM

## 2023-03-23 DIAGNOSIS — H40.1113 PRIMARY OPEN-ANGLE GLAUCOMA, RIGHT EYE, SEVERE STAGE: Primary | ICD-10-CM

## 2023-03-23 PROCEDURE — 99214 OFFICE O/P EST MOD 30 MIN: CPT | Performed by: OPHTHALMOLOGY

## 2023-03-23 PROCEDURE — 76514 ECHO EXAM OF EYE THICKNESS: CPT | Performed by: OPHTHALMOLOGY

## 2023-03-23 PROCEDURE — 92020 GONIOSCOPY: CPT | Performed by: OPHTHALMOLOGY

## 2023-03-23 PROCEDURE — 92133 CPTRZD OPH DX IMG PST SGM ON: CPT | Performed by: OPHTHALMOLOGY

## 2023-03-23 ASSESSMENT — INTRAOCULAR PRESSURE
OS: 28
OD: 37

## 2023-03-23 NOTE — IMPRESSION/PLAN
Impression: Primary open-angle glaucoma, right eye, severe stage: H40.1113. CCT thick OU Trab OD ALT OS 1/12/17 ALT OD 1/24/17 SLT OD 2/11/22, repeat 4/8/22 Intolerant to Rhopressa Plan: Discussed diagnosis, explained and understood by patient. Discussed IOP/ONH/Glaucoma management and risks. OCT ordered and reviewed today. IOP elevated OD>OS. Continue rocklatan qhs ou, dorzolamide tid ou, and brimoidine bid ou. Restart timolol bid ou. Recheck IOP with repeat VF.

## 2023-04-04 ENCOUNTER — OFFICE VISIT (OUTPATIENT)
Dept: URBAN - METROPOLITAN AREA CLINIC 28 | Facility: CLINIC | Age: 83
End: 2023-04-04
Payer: MEDICARE

## 2023-04-04 DIAGNOSIS — H40.1113 PRIMARY OPEN-ANGLE GLAUCOMA, RIGHT EYE, SEVERE STAGE: Primary | ICD-10-CM

## 2023-04-04 PROCEDURE — 99213 OFFICE O/P EST LOW 20 MIN: CPT | Performed by: OPHTHALMOLOGY

## 2023-04-04 PROCEDURE — 92083 EXTENDED VISUAL FIELD XM: CPT | Performed by: OPHTHALMOLOGY

## 2023-04-04 RX ORDER — TIMOLOL MALEATE 6.8 MG/ML
0.5 % SOLUTION/ DROPS OPHTHALMIC
Qty: 15 | Refills: 3 | Status: ACTIVE
Start: 2023-04-04

## 2023-04-04 RX ORDER — BRIMONIDINE TARTRATE 2 MG/ML
0.2 % SOLUTION/ DROPS OPHTHALMIC
Qty: 15 | Refills: 3 | Status: ACTIVE
Start: 2023-04-04

## 2023-04-04 RX ORDER — NETARSUDIL AND LATANOPROST OPHTHALMIC SOLUTION, 0.02%/0.005% .2; .05 MG/ML; MG/ML
SOLUTION/ DROPS OPHTHALMIC; TOPICAL
Qty: 5 | Refills: 5 | Status: ACTIVE
Start: 2023-04-04

## 2023-04-04 RX ORDER — DORZOLAMIDE HCL 20 MG/ML
2 % SOLUTION/ DROPS OPHTHALMIC
Qty: 15 | Refills: 3 | Status: ACTIVE
Start: 2023-04-04

## 2023-04-04 ASSESSMENT — INTRAOCULAR PRESSURE
OS: 30
OD: 40

## 2023-04-04 NOTE — IMPRESSION/PLAN
Impression: Primary open-angle glaucoma, right eye, severe stage: H40.1113. Average CCT, IOP Elevated OU Trab OD ALT OS 1/12/17 ALT OD 1/24/17 SLT OD 2/11/22, repeat 4/8/22 Intolerant to Rhopressa Plan: Discussed diagnosis, explained and understood by patient. Discussed IOP/ONH/Glaucoma management and risks. VF ordered and reviewed today. IOP elevated OD>OS. Continue rocklatan qhs ou, dorzolamide tid ou, and brimoidine bid ou, timolol qam ou. Discussed adding drops vs laser. Patient elects SLT. Recommend Trabeculoplasty (SLT) OS  to possibly lower IOP. Discussed RBA's of laser trabeculoplasty OS . RL=2 Discussed additional surgery to lower IOP OD, recommend F/U with Dr Cinthia Ly for possible shunt.

## 2023-06-15 ENCOUNTER — OFFICE VISIT (OUTPATIENT)
Dept: URBAN - METROPOLITAN AREA CLINIC 28 | Facility: CLINIC | Age: 83
End: 2023-06-15
Payer: MEDICARE

## 2023-06-15 DIAGNOSIS — H40.1122 PRIMARY OPEN-ANGLE GLAUCOMA, LEFT EYE, MODERATE STAGE: ICD-10-CM

## 2023-06-15 DIAGNOSIS — H40.1113 PRIMARY OPEN-ANGLE GLAUCOMA, RIGHT EYE, SEVERE STAGE: Primary | ICD-10-CM

## 2023-06-15 PROCEDURE — 99213 OFFICE O/P EST LOW 20 MIN: CPT | Performed by: OPHTHALMOLOGY

## 2023-06-15 RX ORDER — PREDNISOLONE ACETATE 10 MG/ML
1 % SUSPENSION/ DROPS OPHTHALMIC
Qty: 5 | Refills: 0 | Status: ACTIVE
Start: 2023-06-15

## 2023-06-15 ASSESSMENT — INTRAOCULAR PRESSURE
OS: 23
OD: 34

## 2023-06-15 NOTE — IMPRESSION/PLAN
Impression: Primary open-angle glaucoma, right eye, severe stage: H40.1113. Average CCT, IOP Elevated OU Trab OD ALT OS 1/12/17 ALT OD 1/24/17 SLT OD 2/11/22, repeat 4/8/22 Intolerant to Rhopressa Plan: Discussed diagnosis, explained and understood by patient. Discussed IOP/ONH/Glaucoma management and risks. Continue rocklatan qhs ou, dorzolamide tid ou, and brimoidine bid ou, timolol qam ou. IOP elevated OD>OS. Recommend G-probe Trans-scleral CPC diode laser OD to possibly lower IOP. Patient elects [facility] laser procedure. Discussed RBA's and treatment options. RL=2. Start prednisolone qid after laser. May need SLT OS.

## 2023-08-21 ENCOUNTER — TESTING ONLY (OUTPATIENT)
Dept: URBAN - METROPOLITAN AREA CLINIC 28 | Facility: CLINIC | Age: 83
End: 2023-08-21
Payer: MEDICARE

## 2023-08-21 DIAGNOSIS — H52.223 REGULAR ASTIGMATISM, BILATERAL: Primary | ICD-10-CM

## 2023-08-21 DIAGNOSIS — H40.1122 PRIMARY OPEN-ANGLE GLAUCOMA, LEFT EYE, MODERATE STAGE: ICD-10-CM

## 2023-08-21 DIAGNOSIS — H40.1113 PRIMARY OPEN-ANGLE GLAUCOMA, RIGHT EYE, SEVERE STAGE: ICD-10-CM

## 2023-08-21 PROCEDURE — 92012 INTRM OPH EXAM EST PATIENT: CPT | Performed by: OPTOMETRIST

## 2023-08-21 ASSESSMENT — VISUAL ACUITY
OS: 20/30
OD: 20/200

## 2023-08-21 ASSESSMENT — INTRAOCULAR PRESSURE
OD: 38
OS: 26

## 2023-10-31 ENCOUNTER — OFFICE VISIT (OUTPATIENT)
Dept: URBAN - METROPOLITAN AREA CLINIC 28 | Facility: CLINIC | Age: 83
End: 2023-10-31
Payer: MEDICARE

## 2023-10-31 DIAGNOSIS — H40.1113 PRIMARY OPEN-ANGLE GLAUCOMA, RIGHT EYE, SEVERE STAGE: Primary | ICD-10-CM

## 2023-10-31 DIAGNOSIS — H40.1122 PRIMARY OPEN-ANGLE GLAUCOMA, LEFT EYE, MODERATE STAGE: ICD-10-CM

## 2023-10-31 PROCEDURE — 99213 OFFICE O/P EST LOW 20 MIN: CPT | Performed by: OPHTHALMOLOGY

## 2023-10-31 ASSESSMENT — INTRAOCULAR PRESSURE
OD: 36
OS: 26

## 2023-11-30 ENCOUNTER — OFFICE VISIT (OUTPATIENT)
Dept: URBAN - METROPOLITAN AREA CLINIC 28 | Facility: CLINIC | Age: 83
End: 2023-11-30
Payer: MEDICARE

## 2023-11-30 DIAGNOSIS — H40.1122 PRIMARY OPEN-ANGLE GLAUCOMA, LEFT EYE, MODERATE STAGE: ICD-10-CM

## 2023-11-30 DIAGNOSIS — H40.1113 PRIMARY OPEN-ANGLE GLAUCOMA, RIGHT EYE, SEVERE STAGE: Primary | ICD-10-CM

## 2023-11-30 PROCEDURE — 99213 OFFICE O/P EST LOW 20 MIN: CPT | Performed by: OPHTHALMOLOGY

## 2023-11-30 ASSESSMENT — INTRAOCULAR PRESSURE
OD: 26
OS: 19

## 2024-06-11 ENCOUNTER — OFFICE VISIT (OUTPATIENT)
Dept: URBAN - METROPOLITAN AREA CLINIC 28 | Facility: CLINIC | Age: 84
End: 2024-06-11
Payer: MEDICARE

## 2024-06-11 DIAGNOSIS — H40.1113 PRIMARY OPEN-ANGLE GLAUCOMA, RIGHT EYE, SEVERE STAGE: Primary | ICD-10-CM

## 2024-06-11 DIAGNOSIS — H40.1122 PRIMARY OPEN-ANGLE GLAUCOMA, LEFT EYE, MODERATE STAGE: ICD-10-CM

## 2024-06-11 PROCEDURE — 92133 CPTRZD OPH DX IMG PST SGM ON: CPT | Performed by: OPHTHALMOLOGY

## 2024-06-11 PROCEDURE — 99213 OFFICE O/P EST LOW 20 MIN: CPT | Performed by: OPHTHALMOLOGY

## 2024-06-11 RX ORDER — TIMOLOL MALEATE 6.8 MG/ML
0.5 % SOLUTION/ DROPS OPHTHALMIC
Qty: 15 | Refills: 3 | Status: ACTIVE
Start: 2024-06-11

## 2024-06-11 RX ORDER — NETARSUDIL AND LATANOPROST OPHTHALMIC SOLUTION, 0.02%/0.005% .2; .05 MG/ML; MG/ML
SOLUTION/ DROPS OPHTHALMIC; TOPICAL
Qty: 5 | Refills: 5 | Status: ACTIVE
Start: 2024-06-11

## 2024-06-11 RX ORDER — BRIMONIDINE TARTRATE 2 MG/ML
0.2 % SOLUTION/ DROPS OPHTHALMIC
Qty: 15 | Refills: 3 | Status: ACTIVE
Start: 2024-06-11

## 2024-06-11 RX ORDER — PREDNISOLONE ACETATE 10 MG/ML
1 % SUSPENSION/ DROPS OPHTHALMIC
Qty: 5 | Refills: 0 | Status: ACTIVE
Start: 2024-06-11

## 2024-06-11 ASSESSMENT — INTRAOCULAR PRESSURE
OD: 25
OS: 18

## 2025-01-07 ENCOUNTER — OFFICE VISIT (OUTPATIENT)
Dept: URBAN - METROPOLITAN AREA CLINIC 28 | Facility: CLINIC | Age: 85
End: 2025-01-07
Payer: MEDICARE

## 2025-01-07 DIAGNOSIS — H40.1113 PRIMARY OPEN-ANGLE GLAUCOMA, RIGHT EYE, SEVERE STAGE: ICD-10-CM

## 2025-01-07 DIAGNOSIS — H25.12 AGE-RELATED NUCLEAR CATARACT, LEFT EYE: ICD-10-CM

## 2025-01-07 DIAGNOSIS — H40.1122 PRIMARY OPEN-ANGLE GLAUCOMA, LEFT EYE, MODERATE STAGE: Primary | ICD-10-CM

## 2025-01-07 PROCEDURE — 99213 OFFICE O/P EST LOW 20 MIN: CPT | Performed by: OPHTHALMOLOGY

## 2025-01-07 RX ORDER — TIMOLOL MALEATE 6.8 MG/ML
0.5 % SOLUTION/ DROPS OPHTHALMIC
Qty: 15 | Refills: 3 | Status: ACTIVE
Start: 2025-01-07

## 2025-01-07 RX ORDER — BRIMONIDINE TARTRATE 2 MG/ML
0.2 % SOLUTION/ DROPS OPHTHALMIC
Qty: 15 | Refills: 3 | Status: ACTIVE
Start: 2025-01-07

## 2025-01-07 RX ORDER — NETARSUDIL AND LATANOPROST OPHTHALMIC SOLUTION, 0.02%/0.005% .2; .05 MG/ML; MG/ML
SOLUTION/ DROPS OPHTHALMIC; TOPICAL
Qty: 5 | Refills: 5 | Status: ACTIVE
Start: 2025-01-07

## 2025-01-07 ASSESSMENT — INTRAOCULAR PRESSURE
OD: 29
OS: 21

## 2025-01-31 ENCOUNTER — TECH ONLY (OUTPATIENT)
Facility: LOCATION | Age: 85
End: 2025-01-31
Payer: MEDICARE

## 2025-01-31 ENCOUNTER — ADULT PHYSICAL (OUTPATIENT)
Facility: LOCATION | Age: 85
End: 2025-01-31
Payer: MEDICARE

## 2025-01-31 DIAGNOSIS — Z01.818 ENCOUNTER FOR OTHER PREPROCEDURAL EXAMINATION: Primary | ICD-10-CM

## 2025-01-31 DIAGNOSIS — H25.812 COMBINED FORMS OF AGE-RELATED CATARACT, LEFT EYE: Primary | ICD-10-CM

## 2025-01-31 DIAGNOSIS — H40.1133 PRIMARY OPEN-ANGLE GLAUCOMA, BILATERAL, SEVERE STAGE: ICD-10-CM

## 2025-01-31 DIAGNOSIS — H25.813 COMBINED FORMS OF AGE-RELATED CATARACT, BILATERAL: ICD-10-CM

## 2025-01-31 PROCEDURE — 99203 OFFICE O/P NEW LOW 30 MIN: CPT

## 2025-01-31 RX ORDER — LOSARTAN POTASSIUM AND HYDROCHLOROTHIAZIDE 100; 25 MG/1; MG/1
TABLET, FILM COATED ORAL
Qty: 0 | Refills: 0 | Status: ACTIVE
Start: 2025-01-31

## 2025-01-31 RX ORDER — AMLODIPINE BESYLATE 5 MG/1
5 MG TABLET ORAL
Qty: 0 | Refills: 0 | Status: ACTIVE
Start: 2025-01-31

## 2025-02-06 ENCOUNTER — SURGERY (OUTPATIENT)
Facility: LOCATION | Age: 85
End: 2025-02-06
Payer: MEDICARE

## 2025-02-06 PROCEDURE — 66991 XCAPSL CTRC RMVL INSJ 1+: CPT | Performed by: OPHTHALMOLOGY

## 2025-02-06 RX ORDER — PREDNISOLONE ACETATE 10 MG/ML
1 % SUSPENSION/ DROPS OPHTHALMIC
Qty: 10 | Refills: 1 | Status: ACTIVE
Start: 2025-02-06

## 2025-02-06 RX ORDER — OFLOXACIN 3 MG/ML
0.3 % SOLUTION/ DROPS OPHTHALMIC
Qty: 5 | Refills: 1 | Status: ACTIVE
Start: 2025-02-06

## 2025-02-07 ENCOUNTER — POST-OPERATIVE VISIT (OUTPATIENT)
Dept: URBAN - METROPOLITAN AREA CLINIC 28 | Facility: CLINIC | Age: 85
End: 2025-02-07
Payer: MEDICARE

## 2025-02-07 DIAGNOSIS — Z96.1 PRESENCE OF INTRAOCULAR LENS: Primary | ICD-10-CM

## 2025-02-07 PROCEDURE — 99024 POSTOP FOLLOW-UP VISIT: CPT | Performed by: OPTOMETRIST

## 2025-02-07 ASSESSMENT — INTRAOCULAR PRESSURE
OD: 26
OS: 20

## 2025-02-14 ENCOUNTER — POST-OPERATIVE VISIT (OUTPATIENT)
Dept: URBAN - METROPOLITAN AREA CLINIC 28 | Facility: CLINIC | Age: 85
End: 2025-02-14
Payer: MEDICARE

## 2025-02-14 DIAGNOSIS — Z48.810 ENCOUNTER FOR SURGICAL AFTERCARE FOLLOWING SURGERY ON A SENSE ORGAN: Primary | ICD-10-CM

## 2025-02-14 PROCEDURE — 99024 POSTOP FOLLOW-UP VISIT: CPT | Performed by: OPTOMETRIST

## 2025-02-14 ASSESSMENT — INTRAOCULAR PRESSURE
OD: 26
OS: 19

## 2025-03-07 ENCOUNTER — POST-OPERATIVE VISIT (OUTPATIENT)
Dept: URBAN - METROPOLITAN AREA CLINIC 28 | Facility: CLINIC | Age: 85
End: 2025-03-07
Payer: MEDICARE

## 2025-03-07 DIAGNOSIS — Z96.1 PRESENCE OF INTRAOCULAR LENS: Primary | ICD-10-CM

## 2025-03-07 ASSESSMENT — INTRAOCULAR PRESSURE
OD: 34
OS: 14

## 2025-03-21 ENCOUNTER — POST-OPERATIVE VISIT (OUTPATIENT)
Dept: URBAN - METROPOLITAN AREA CLINIC 28 | Facility: CLINIC | Age: 85
End: 2025-03-21
Payer: MEDICARE

## 2025-03-21 DIAGNOSIS — H59.032 CYSTOID MACULAR EDEMA FOLLOWING CATARACT SURGERY, LEFT EYE: ICD-10-CM

## 2025-03-21 DIAGNOSIS — H52.223 REGULAR ASTIGMATISM, BILATERAL: ICD-10-CM

## 2025-03-21 DIAGNOSIS — Z48.810 ENCOUNTER FOR SURGICAL AFTERCARE FOLLOWING SURGERY ON A SENSE ORGAN: Primary | ICD-10-CM

## 2025-03-21 DIAGNOSIS — H26.492 OTHER SECONDARY CATARACT, LEFT EYE: ICD-10-CM

## 2025-03-21 PROCEDURE — 99024 POSTOP FOLLOW-UP VISIT: CPT | Performed by: OPTOMETRIST

## 2025-03-21 RX ORDER — KETOROLAC TROMETHAMINE 5 MG/ML
0.5 % SOLUTION OPHTHALMIC
Qty: 5 | Refills: 1 | Status: ACTIVE
Start: 2025-03-21

## 2025-03-21 ASSESSMENT — KERATOMETRY
OD: 44.88
OS: 43.00

## 2025-03-21 ASSESSMENT — VISUAL ACUITY
OS: 20/80
OD: HM

## 2025-03-21 ASSESSMENT — INTRAOCULAR PRESSURE
OS: 15
OD: 35

## 2025-06-03 ENCOUNTER — OFFICE VISIT (OUTPATIENT)
Dept: URBAN - METROPOLITAN AREA CLINIC 24 | Facility: CLINIC | Age: 85
End: 2025-06-03
Payer: MEDICARE

## 2025-06-03 DIAGNOSIS — H26.492 OTHER SECONDARY CATARACT, LEFT EYE: ICD-10-CM

## 2025-06-03 DIAGNOSIS — H40.1133 PRIMARY OPEN-ANGLE GLAUCOMA, BILATERAL, SEVERE STAGE: Primary | ICD-10-CM

## 2025-06-03 PROCEDURE — 99214 OFFICE O/P EST MOD 30 MIN: CPT | Performed by: OPHTHALMOLOGY

## 2025-06-03 RX ORDER — DORZOLAMIDE HCL 20 MG/ML
2 % SOLUTION/ DROPS OPHTHALMIC
Qty: 10 | Refills: 3 | Status: ACTIVE
Start: 2025-06-03

## 2025-06-03 RX ORDER — TIMOLOL MALEATE 6.8 MG/ML
0.5 % SOLUTION/ DROPS OPHTHALMIC
Qty: 15 | Refills: 3 | Status: ACTIVE
Start: 2025-06-03

## 2025-06-03 RX ORDER — NETARSUDIL AND LATANOPROST OPHTHALMIC SOLUTION, 0.02%/0.005% .2; .05 MG/ML; MG/ML
SOLUTION/ DROPS OPHTHALMIC; TOPICAL
Qty: 5 | Refills: 5 | Status: ACTIVE
Start: 2025-06-03

## 2025-06-03 ASSESSMENT — VISUAL ACUITY
OS: 20/100
OD: HM

## 2025-06-03 ASSESSMENT — INTRAOCULAR PRESSURE
OS: 21
OD: 34

## 2025-08-19 ENCOUNTER — SURGERY (OUTPATIENT)
Facility: LOCATION | Age: 85
End: 2025-08-19
Payer: MEDICARE

## 2025-08-19 PROCEDURE — 66821 AFTER CATARACT LASER SURGERY: CPT | Performed by: OPHTHALMOLOGY
